# Patient Record
(demographics unavailable — no encounter records)

---

## 2024-12-31 NOTE — PLAN
[FreeTextEntry1] : d/w pt findings, their genetics, expressions, inheritance and risks to baby. discussed carrier testing and genetics consultation. pt would like blood work and see what the results are first. blood work sent

## 2024-12-31 NOTE — HISTORY OF PRESENT ILLNESS
[FreeTextEntry1] : pt presents with significant other to discuss horizon screen. 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